# Patient Record
Sex: MALE | Race: WHITE | NOT HISPANIC OR LATINO
[De-identification: names, ages, dates, MRNs, and addresses within clinical notes are randomized per-mention and may not be internally consistent; named-entity substitution may affect disease eponyms.]

---

## 2019-10-07 ENCOUNTER — APPOINTMENT (OUTPATIENT)
Dept: DERMATOLOGY | Facility: CLINIC | Age: 49
End: 2019-10-07

## 2019-10-21 ENCOUNTER — EMERGENCY (EMERGENCY)
Facility: HOSPITAL | Age: 49
LOS: 1 days | Discharge: HOME | End: 2019-10-21
Attending: EMERGENCY MEDICINE
Payer: COMMERCIAL

## 2019-10-21 VITALS
DIASTOLIC BLOOD PRESSURE: 88 MMHG | SYSTOLIC BLOOD PRESSURE: 158 MMHG | WEIGHT: 244.93 LBS | HEIGHT: 69 IN | TEMPERATURE: 98 F | OXYGEN SATURATION: 98 % | RESPIRATION RATE: 20 BRPM | HEART RATE: 79 BPM

## 2019-10-21 LAB
ALBUMIN SERPL ELPH-MCNC: 4.7 G/DL — SIGNIFICANT CHANGE UP (ref 3.3–5.2)
ALP SERPL-CCNC: 65 U/L — SIGNIFICANT CHANGE UP (ref 40–120)
ALT FLD-CCNC: 32 U/L — SIGNIFICANT CHANGE UP
ANION GAP SERPL CALC-SCNC: 10 MMOL/L — SIGNIFICANT CHANGE UP (ref 5–17)
AST SERPL-CCNC: 20 U/L — SIGNIFICANT CHANGE UP
BASOPHILS # BLD AUTO: 0.04 K/UL — SIGNIFICANT CHANGE UP (ref 0–0.2)
BASOPHILS NFR BLD AUTO: 0.4 % — SIGNIFICANT CHANGE UP (ref 0–2)
BILIRUB SERPL-MCNC: 0.4 MG/DL — SIGNIFICANT CHANGE UP (ref 0.4–2)
BUN SERPL-MCNC: 19 MG/DL — SIGNIFICANT CHANGE UP (ref 8–20)
CALCIUM SERPL-MCNC: 9.1 MG/DL — SIGNIFICANT CHANGE UP (ref 8.6–10.2)
CHLORIDE SERPL-SCNC: 103 MMOL/L — SIGNIFICANT CHANGE UP (ref 98–107)
CO2 SERPL-SCNC: 27 MMOL/L — SIGNIFICANT CHANGE UP (ref 22–29)
CREAT SERPL-MCNC: 0.98 MG/DL — SIGNIFICANT CHANGE UP (ref 0.5–1.3)
EOSINOPHIL # BLD AUTO: 0.02 K/UL — SIGNIFICANT CHANGE UP (ref 0–0.5)
EOSINOPHIL NFR BLD AUTO: 0.2 % — SIGNIFICANT CHANGE UP (ref 0–6)
GLUCOSE SERPL-MCNC: 128 MG/DL — HIGH (ref 70–115)
HCT VFR BLD CALC: 48 % — SIGNIFICANT CHANGE UP (ref 39–50)
HGB BLD-MCNC: 15.8 G/DL — SIGNIFICANT CHANGE UP (ref 13–17)
IMM GRANULOCYTES NFR BLD AUTO: 0.5 % — SIGNIFICANT CHANGE UP (ref 0–1.5)
LIDOCAIN IGE QN: 17 U/L — LOW (ref 22–51)
LYMPHOCYTES # BLD AUTO: 1.02 K/UL — SIGNIFICANT CHANGE UP (ref 1–3.3)
LYMPHOCYTES # BLD AUTO: 9.3 % — LOW (ref 13–44)
MCHC RBC-ENTMCNC: 30.5 PG — SIGNIFICANT CHANGE UP (ref 27–34)
MCHC RBC-ENTMCNC: 32.9 GM/DL — SIGNIFICANT CHANGE UP (ref 32–36)
MCV RBC AUTO: 92.7 FL — SIGNIFICANT CHANGE UP (ref 80–100)
MONOCYTES # BLD AUTO: 0.49 K/UL — SIGNIFICANT CHANGE UP (ref 0–0.9)
MONOCYTES NFR BLD AUTO: 4.5 % — SIGNIFICANT CHANGE UP (ref 2–14)
NEUTROPHILS # BLD AUTO: 9.38 K/UL — HIGH (ref 1.8–7.4)
NEUTROPHILS NFR BLD AUTO: 85.1 % — HIGH (ref 43–77)
PLATELET # BLD AUTO: 206 K/UL — SIGNIFICANT CHANGE UP (ref 150–400)
POTASSIUM SERPL-MCNC: 4.3 MMOL/L — SIGNIFICANT CHANGE UP (ref 3.5–5.3)
POTASSIUM SERPL-SCNC: 4.3 MMOL/L — SIGNIFICANT CHANGE UP (ref 3.5–5.3)
PROT SERPL-MCNC: 7.5 G/DL — SIGNIFICANT CHANGE UP (ref 6.6–8.7)
RBC # BLD: 5.18 M/UL — SIGNIFICANT CHANGE UP (ref 4.2–5.8)
RBC # FLD: 13 % — SIGNIFICANT CHANGE UP (ref 10.3–14.5)
SODIUM SERPL-SCNC: 140 MMOL/L — SIGNIFICANT CHANGE UP (ref 135–145)
WBC # BLD: 11.01 K/UL — HIGH (ref 3.8–10.5)
WBC # FLD AUTO: 11.01 K/UL — HIGH (ref 3.8–10.5)

## 2019-10-21 PROCEDURE — 99284 EMERGENCY DEPT VISIT MOD MDM: CPT

## 2019-10-21 PROCEDURE — 74176 CT ABD & PELVIS W/O CONTRAST: CPT | Mod: 26

## 2019-10-21 RX ORDER — SODIUM CHLORIDE 9 MG/ML
3 INJECTION INTRAMUSCULAR; INTRAVENOUS; SUBCUTANEOUS ONCE
Refills: 0 | Status: COMPLETED | OUTPATIENT
Start: 2019-10-21 | End: 2019-10-21

## 2019-10-21 RX ADMIN — SODIUM CHLORIDE 3 MILLILITER(S): 9 INJECTION INTRAMUSCULAR; INTRAVENOUS; SUBCUTANEOUS at 23:06

## 2019-10-21 NOTE — ED PROVIDER NOTE - PATIENT PORTAL LINK FT
You can access the FollowMyHealth Patient Portal offered by [facility] by registering at the following website: [email or web address]. By joining [facility]’s FollowMyHealth portal, you will also be able to view your health information using other applications (apps) compatible with our system.

## 2019-10-21 NOTE — ED ADULT TRIAGE NOTE - BSA (M2)
For information on Fall & Injury Prevention, visit [email or web address]
2.25

## 2019-10-21 NOTE — ED ADULT TRIAGE NOTE - ACCOMPANIED BY
Pam,  Good news!  Your labs are all normal.  Here's a copy for your records.  Marga Nichole NP  Endocrinology   EMT/paramedic

## 2019-10-21 NOTE — ED PROVIDER NOTE - PROGRESS NOTE DETAILS
CT results and labs as noted.  Pt stable for d/c with Urology f/u as outpt. Pt has been seen by [facility] in the past

## 2019-10-21 NOTE — ED ADULT TRIAGE NOTE - CHIEF COMPLAINT QUOTE
pt with right flank pain radiating to front starting around 7:30 pm comes and goes with nausea and vomiting.

## 2019-10-21 NOTE — ED PROVIDER NOTE - OBJECTIVE STATEMENT
49 y/o M pt with significant PMHx of Sciatica, HLD presents to the ED c/o sudden onset right sided flank pain that started 18:30 today. Pt states he was watching TV and fell asleep, when he woke up he thought he had to have a bowel movement, and the pain began. The pain reached a 12/10 and radiates to the right side of his abdomen, but expresses that due to his sciatica its hard to describe the pain. Denies dysuria. No further complaints at this time.

## 2019-10-22 LAB
APPEARANCE UR: CLEAR — SIGNIFICANT CHANGE UP
BILIRUB UR-MCNC: NEGATIVE — SIGNIFICANT CHANGE UP
COLOR SPEC: YELLOW — SIGNIFICANT CHANGE UP
DIFF PNL FLD: ABNORMAL
EPI CELLS # UR: SIGNIFICANT CHANGE UP
GLUCOSE UR QL: 50 MG/DL
KETONES UR-MCNC: ABNORMAL
LEUKOCYTE ESTERASE UR-ACNC: NEGATIVE — SIGNIFICANT CHANGE UP
NITRITE UR-MCNC: NEGATIVE — SIGNIFICANT CHANGE UP
PH UR: 6 — SIGNIFICANT CHANGE UP (ref 5–8)
PROT UR-MCNC: 30 MG/DL
RBC CASTS # UR COMP ASSIST: SIGNIFICANT CHANGE UP /HPF (ref 0–4)
SP GR SPEC: 1.02 — SIGNIFICANT CHANGE UP (ref 1.01–1.02)
UROBILINOGEN FLD QL: NEGATIVE MG/DL — SIGNIFICANT CHANGE UP
WBC UR QL: NEGATIVE — SIGNIFICANT CHANGE UP

## 2019-10-22 PROCEDURE — 36415 COLL VENOUS BLD VENIPUNCTURE: CPT

## 2019-10-22 PROCEDURE — 83690 ASSAY OF LIPASE: CPT

## 2019-10-22 PROCEDURE — 80053 COMPREHEN METABOLIC PANEL: CPT

## 2019-10-22 PROCEDURE — 81001 URINALYSIS AUTO W/SCOPE: CPT

## 2019-10-22 PROCEDURE — 74176 CT ABD & PELVIS W/O CONTRAST: CPT

## 2019-10-22 PROCEDURE — 85027 COMPLETE CBC AUTOMATED: CPT

## 2019-10-22 PROCEDURE — 99284 EMERGENCY DEPT VISIT MOD MDM: CPT | Mod: 25

## 2019-10-22 RX ORDER — TAMSULOSIN HYDROCHLORIDE 0.4 MG/1
1 CAPSULE ORAL
Qty: 10 | Refills: 0
Start: 2019-10-22 | End: 2019-10-31

## 2019-10-22 RX ORDER — TAMSULOSIN HYDROCHLORIDE 0.4 MG/1
0.4 CAPSULE ORAL ONCE
Refills: 0 | Status: COMPLETED | OUTPATIENT
Start: 2019-10-22 | End: 2019-10-22

## 2019-10-22 RX ORDER — OXYCODONE AND ACETAMINOPHEN 5; 325 MG/1; MG/1
1 TABLET ORAL ONCE
Refills: 0 | Status: DISCONTINUED | OUTPATIENT
Start: 2019-10-22 | End: 2019-10-22

## 2019-10-22 RX ADMIN — TAMSULOSIN HYDROCHLORIDE 0.4 MILLIGRAM(S): 0.4 CAPSULE ORAL at 01:32

## 2019-10-22 RX ADMIN — OXYCODONE AND ACETAMINOPHEN 1 TABLET(S): 5; 325 TABLET ORAL at 01:32

## 2020-12-01 ENCOUNTER — TRANSCRIPTION ENCOUNTER (OUTPATIENT)
Age: 50
End: 2020-12-01

## 2021-04-22 ENCOUNTER — TRANSCRIPTION ENCOUNTER (OUTPATIENT)
Age: 51
End: 2021-04-22

## 2022-02-16 ENCOUNTER — TRANSCRIPTION ENCOUNTER (OUTPATIENT)
Age: 52
End: 2022-02-16

## 2023-04-03 ENCOUNTER — OFFICE (OUTPATIENT)
Dept: URBAN - METROPOLITAN AREA CLINIC 113 | Facility: CLINIC | Age: 53
Setting detail: OPHTHALMOLOGY
End: 2023-04-03
Payer: COMMERCIAL

## 2023-04-03 DIAGNOSIS — H40.013: ICD-10-CM

## 2023-04-03 DIAGNOSIS — H11.153: ICD-10-CM

## 2023-04-03 PROCEDURE — 92133 CPTRZD OPH DX IMG PST SGM ON: CPT | Performed by: OPTOMETRIST

## 2023-04-03 PROCEDURE — 76514 ECHO EXAM OF EYE THICKNESS: CPT | Performed by: OPTOMETRIST

## 2023-04-03 PROCEDURE — 92014 COMPRE OPH EXAM EST PT 1/>: CPT | Performed by: OPTOMETRIST

## 2023-04-03 ASSESSMENT — KERATOMETRY
METHOD_AUTO_MANUAL: AUTO
OS_K2POWER_DIOPTERS: 44.25
OS_AXISANGLE_DEGREES: 173
OD_K1POWER_DIOPTERS: 43.25
OS_K1POWER_DIOPTERS: 43.50
OD_K2POWER_DIOPTERS: 43.50
OD_AXISANGLE_DEGREES: 178

## 2023-04-03 ASSESSMENT — REFRACTION_CURRENTRX
OS_SPHERE: +2.50
OS_CYLINDER: -0.50
OS_OVR_VA: 20/
OD_SPHERE: +1.75
OS_AXIS: 093
OD_OVR_VA: 20/
OS_VPRISM_DIRECTION: SV
OD_CYLINDER: SPH
OD_VPRISM_DIRECTION: SV

## 2023-04-03 ASSESSMENT — TONOMETRY
OS_IOP_MMHG: 19
OS_IOP_MMHG: 19
OD_IOP_MMHG: 20
OD_IOP_MMHG: 19

## 2023-04-03 ASSESSMENT — PACHYMETRY
OS_CT_CORRECTION: -3
OD_CT_CORRECTION: -3
OD_CT_UM: 584
OS_CT_UM: 582

## 2023-04-03 ASSESSMENT — REFRACTION_AUTOREFRACTION
OD_AXIS: 058
OD_CYLINDER: -0.25
OS_SPHERE: +0.75
OD_SPHERE: +0.25
OS_AXIS: 094
OS_CYLINDER: -0.75

## 2023-04-03 ASSESSMENT — VISUAL ACUITY
OS_BCVA: 20/20
OD_BCVA: 20/20

## 2023-04-03 ASSESSMENT — REFRACTION_MANIFEST
OD_CYLINDER: SPH
OS_AXIS: 095
OS_AXIS: 095
OS_VA1: 20/20
OS_CYLINDER: -0.50
OD_ADD: +2.25
OD_SPHERE: +1.50
OS_SPHERE: +0.50
OD_SPHERE: PLANO
OS_CYLINDER: -0.50
OD_CYLINDER: SPH
OS_ADD: +2.25
OS_SPHERE: +2.25
OD_VA1: 20/20

## 2023-04-03 ASSESSMENT — AXIALLENGTH_DERIVED
OS_AL: 23.3591
OS_AL: 22.7093
OD_AL: 23.5891
OS_AL: 23.3114

## 2023-04-03 ASSESSMENT — CONFRONTATIONAL VISUAL FIELD TEST (CVF)
OS_FINDINGS: FULL
OD_FINDINGS: FULL

## 2023-04-03 ASSESSMENT — SPHEQUIV_DERIVED
OD_SPHEQUIV: 0.125
OS_SPHEQUIV: 0.375
OS_SPHEQUIV: 2
OS_SPHEQUIV: 0.25

## 2025-06-10 ENCOUNTER — APPOINTMENT (OUTPATIENT)
Dept: DERMATOLOGY | Facility: CLINIC | Age: 55
End: 2025-06-10
Payer: COMMERCIAL

## 2025-06-10 PROCEDURE — 99204 OFFICE O/P NEW MOD 45 MIN: CPT

## 2025-06-23 ENCOUNTER — OFFICE (OUTPATIENT)
Dept: URBAN - METROPOLITAN AREA CLINIC 113 | Facility: CLINIC | Age: 55
Setting detail: OPHTHALMOLOGY
End: 2025-06-23
Payer: COMMERCIAL

## 2025-06-23 DIAGNOSIS — Z01.00: ICD-10-CM

## 2025-06-23 PROCEDURE — 92014 COMPRE OPH EXAM EST PT 1/>: CPT | Performed by: OPTOMETRIST

## 2025-06-23 ASSESSMENT — TONOMETRY
OS_IOP_MMHG: 18
OD_IOP_MMHG: 16

## 2025-06-23 ASSESSMENT — REFRACTION_CURRENTRX
OS_SPHERE: +3.00
OD_OVR_VA: 20/
OS_CYLINDER: -0.75
OD_CYLINDER: SPH
OS_CYLINDER: -0.50
OS_AXIS: 095
OD_CYLINDER: SHP
OS_SPHERE: +2.50
OS_VPRISM_DIRECTION: SV
OS_OVR_VA: 20/
OS_OVR_VA: 20/
OD_SPHERE: +2.25
OD_VPRISM_DIRECTION: SV
OD_OVR_VA: 20/
OS_AXIS: 093
OD_SPHERE: +1.75
OS_VPRISM_DIRECTION: SV
OD_VPRISM_DIRECTION: SV

## 2025-06-23 ASSESSMENT — REFRACTION_MANIFEST
OD_SPHERE: +1.50
OS_CYLINDER: -0.75
OS_AXIS: 095
OD_VA1: 20/20
OS_SPHERE: +2.25
OS_AXIS: 090
OD_ADD: +2.25
OD_CYLINDER: SPH
OS_SPHERE: +0.75
OS_CYLINDER: -0.50
OS_ADD: +2.25
OD_CYLINDER: SPH
OD_SPHERE: PLANO
OS_VA1: 20/20

## 2025-06-23 ASSESSMENT — KERATOMETRY
OD_AXISANGLE_DEGREES: 178
OS_K1POWER_DIOPTERS: 43.50
OD_K2POWER_DIOPTERS: 43.50
METHOD_AUTO_MANUAL: AUTO
OS_AXISANGLE_DEGREES: 173
OS_K2POWER_DIOPTERS: 44.25
OD_K1POWER_DIOPTERS: 43.25

## 2025-06-23 ASSESSMENT — REFRACTION_AUTOREFRACTION
OD_SPHERE: +0.50
OS_AXIS: 086
OS_SPHERE: +1.00
OD_CYLINDER: -0.50
OS_CYLINDER: -0.75
OD_AXIS: 077

## 2025-06-23 ASSESSMENT — PACHYMETRY
OS_CT_CORRECTION: -3
OD_CT_UM: 584
OD_CT_CORRECTION: -3
OS_CT_UM: 582

## 2025-06-23 ASSESSMENT — CONFRONTATIONAL VISUAL FIELD TEST (CVF)
OS_FINDINGS: FULL
OD_FINDINGS: FULL

## 2025-06-23 ASSESSMENT — VISUAL ACUITY
OD_BCVA: 20/25-1
OS_BCVA: 20/20+1